# Patient Record
Sex: FEMALE | Race: WHITE | Employment: OTHER | URBAN - METROPOLITAN AREA
[De-identification: names, ages, dates, MRNs, and addresses within clinical notes are randomized per-mention and may not be internally consistent; named-entity substitution may affect disease eponyms.]

---

## 2017-03-07 ENCOUNTER — HOSPITAL ENCOUNTER (OUTPATIENT)
Dept: PHYSICAL THERAPY | Age: 82
Discharge: HOME | End: 2017-03-07
Payer: MEDICARE

## 2017-03-07 PROCEDURE — 97166 OT EVAL MOD COMPLEX 45 MIN: CPT

## 2017-03-07 PROCEDURE — G8988 SELF CARE GOAL STATUS: HCPCS

## 2017-03-07 PROCEDURE — G8987 SELF CARE CURRENT STATUS: HCPCS
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
## 2017-03-07 NOTE — PROGRESS NOTES
Ambulatory/Rehab Services H2 Model Falls Risk Assessment    Risk Factor Pts. ·   Confusion/Disorientation/Impulsivity  []    4 ·   Symptomatic Depression  []   2 ·   Altered Elimination  [x]   1 ·   Dizziness/Vertigo  []   1 ·   Gender (Male)  []   1 ·   Any administered antiepileptics (anticonvulsants):  []   2 ·   Any administered benzodiazepines:  []   1 ·   Visual Impairment (specify):  []   1 ·   Portable Oxygen Use  []   1 ·   Orthostatic ? BP  []   1 ·   History of Recent Falls (within 3 mos.)  [x]   5     Ability to Rise from Chair (choose one) Pts. ·   Ability to rise in a single movement  []   0 ·   Pushes up, successful in one attempt  [x]   1 ·   Multiple attempts, but successful  []   3 ·   Unable to rise without assistance  []   4   Total: (5 or greater = High Risk) 7     Falls Prevention Plan:   []                Physical Limitations to Exercise (specify):   [x]                Mobility Assistance Device (type): Manual wheelchair recommended. []                Exercise/Equipment Adaptation (specify):    ©2010 [facility] [place] [address] [place] [address] Patent #[id number].  Federal Law prohibits the replication, distribution or use without written permission from [facility] [employer]

## 2017-03-23 ENCOUNTER — APPOINTMENT (OUTPATIENT)
Dept: PHYSICAL THERAPY | Age: 82
End: 2017-03-23
Payer: MEDICARE

## 2017-04-27 ENCOUNTER — HOSPITAL ENCOUNTER (OUTPATIENT)
Dept: PHYSICAL THERAPY | Age: 82
Discharge: HOME | End: 2017-04-27
Payer: MEDICARE

## 2017-04-27 PROCEDURE — 97168 OT RE-EVAL EST PLAN CARE: CPT

## 2017-04-27 PROCEDURE — G8986 CARRY D/C STATUS: HCPCS

## 2017-04-27 PROCEDURE — G8985 CARRY GOAL STATUS: HCPCS
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
## 2017-07-06 PROBLEM — K21.9 GASTROESOPHAGEAL REFLUX DISEASE WITHOUT ESOPHAGITIS: Status: ACTIVE | Noted: 2017-07-06

## 2017-07-06 PROBLEM — N18.30 CHRONIC KIDNEY DISEASE, STAGE III (MODERATE) (HCC): Status: ACTIVE | Noted: 2017-03-21

## 2017-07-06 PROBLEM — N32.81 DETRUSOR INSTABILITY OF BLADDER: Status: ACTIVE | Noted: 2017-07-06
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
## 2017-07-20 PROBLEM — B35.1 ONYCHOMYCOSIS: Status: ACTIVE | Noted: 2017-07-20

## 2017-10-12 ENCOUNTER — HOSPITAL ENCOUNTER (OUTPATIENT)
Dept: LAB | Age: 82
Discharge: HOME | End: 2017-10-12

## 2017-10-12 LAB
APPEARANCE UR: CLEAR
BACTERIA URNS QL MICRO: 0 /HPF
BILIRUB UR QL: NEGATIVE
CASTS URNS QL MICRO: 0 /LPF
COLOR UR: YELLOW
EPI CELLS #/AREA URNS HPF: 0 /HPF
GLUCOSE UR STRIP.AUTO-MCNC: NEGATIVE MG/DL
HGB UR QL STRIP: ABNORMAL
KETONES UR QL STRIP.AUTO: NEGATIVE MG/DL
LEUKOCYTE ESTERASE UR QL STRIP.AUTO: NEGATIVE
NITRITE UR QL STRIP.AUTO: NEGATIVE
PH UR STRIP: 6 [PH] (ref 5–9)
PROT UR STRIP-MCNC: NEGATIVE MG/DL
RBC #/AREA URNS HPF: ABNORMAL /HPF
SP GR UR REFRACTOMETRY: 1.01 (ref 1–1.02)
UROBILINOGEN UR QL STRIP.AUTO: 0.2 EU/DL (ref 0.2–1)
WBC URNS QL MICRO: 0 /HPF

## 2017-10-12 PROCEDURE — 81001 URINALYSIS AUTO W/SCOPE: CPT | Performed by: FAMILY MEDICINE

## 2018-01-23 PROBLEM — I47.9 PAROXYSMAL TACHYCARDIA (HCC): Status: ACTIVE | Noted: 2018-01-23

## 2018-03-05 PROBLEM — R42 DIZZINESS: Status: ACTIVE | Noted: 2018-03-05

## 2018-12-19 PROBLEM — R42 DIZZINESS: Status: RESOLVED | Noted: 2018-03-05 | Resolved: 2018-12-19

## 2019-01-31 PROBLEM — G31.84 MCI (MILD COGNITIVE IMPAIRMENT): Status: ACTIVE | Noted: 2019-01-31

## 2019-08-07 PROBLEM — E55.9 VITAMIN D DEFICIENCY: Status: ACTIVE | Noted: 2019-08-07

## 2019-08-07 PROBLEM — N39.46 MIXED STRESS AND URGE URINARY INCONTINENCE: Status: ACTIVE | Noted: 2019-08-07
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
## 2020-02-27 PROBLEM — L89.321 PRESSURE INJURY OF LEFT BUTTOCK, STAGE 1: Status: ACTIVE | Noted: 2020-02-27

## 2020-02-27 PROBLEM — N39.3 SUI (STRESS URINARY INCONTINENCE, FEMALE): Status: ACTIVE | Noted: 2020-02-27

## 2020-08-03 PROBLEM — N39.3 SUI (STRESS URINARY INCONTINENCE, FEMALE): Status: RESOLVED | Noted: 2020-02-27 | Resolved: 2020-08-03

## 2020-08-03 PROBLEM — N32.81 OAB (OVERACTIVE BLADDER): Status: RESOLVED | Noted: 2017-07-06 | Resolved: 2020-08-03

## 2021-04-01 PROBLEM — R07.89 OTHER CHEST PAIN: Status: ACTIVE | Noted: 2021-04-01

## 2022-03-18 PROBLEM — R42 DIZZINESS: Status: ACTIVE | Noted: 2018-03-05

## 2022-03-18 PROBLEM — G31.84 MCI (MILD COGNITIVE IMPAIRMENT): Status: ACTIVE | Noted: 2019-01-31

## 2022-03-18 PROBLEM — N18.30 CHRONIC KIDNEY DISEASE, STAGE III (MODERATE) (HCC): Status: ACTIVE | Noted: 2017-03-21

## 2022-03-19 PROBLEM — N39.46 MIXED STRESS AND URGE URINARY INCONTINENCE: Status: ACTIVE | Noted: 2019-08-07

## 2022-03-19 PROBLEM — R07.89 OTHER CHEST PAIN: Status: ACTIVE | Noted: 2021-04-01

## 2022-03-19 PROBLEM — L89.321 PRESSURE INJURY OF LEFT BUTTOCK, STAGE 1: Status: ACTIVE | Noted: 2020-02-27

## 2022-03-19 PROBLEM — I47.9 PAROXYSMAL TACHYCARDIA (HCC): Status: ACTIVE | Noted: 2018-01-23

## 2022-03-20 PROBLEM — K21.9 GASTROESOPHAGEAL REFLUX DISEASE WITHOUT ESOPHAGITIS: Status: ACTIVE | Noted: 2017-07-06

## 2022-03-20 PROBLEM — E55.9 VITAMIN D DEFICIENCY: Status: ACTIVE | Noted: 2019-08-07

## 2022-06-14 ENCOUNTER — OFFICE VISIT (OUTPATIENT)
Dept: CARDIOLOGY CLINIC | Age: 87
End: 2022-06-14
Payer: MEDICARE

## 2022-06-14 VITALS
BODY MASS INDEX: 22.53 KG/M2 | WEIGHT: 132 LBS | DIASTOLIC BLOOD PRESSURE: 76 MMHG | HEART RATE: 68 BPM | SYSTOLIC BLOOD PRESSURE: 126 MMHG | HEIGHT: 64 IN

## 2022-06-14 DIAGNOSIS — G31.84 MCI (MILD COGNITIVE IMPAIRMENT): ICD-10-CM

## 2022-06-14 DIAGNOSIS — R42 DIZZINESS: ICD-10-CM

## 2022-06-14 DIAGNOSIS — I47.9 PAROXYSMAL TACHYCARDIA (HCC): Primary | ICD-10-CM

## 2022-06-14 DIAGNOSIS — I10 PRIMARY HYPERTENSION: ICD-10-CM

## 2022-06-14 PROCEDURE — 93000 ELECTROCARDIOGRAM COMPLETE: CPT | Performed by: INTERNAL MEDICINE

## 2022-06-14 PROCEDURE — 1123F ACP DISCUSS/DSCN MKR DOCD: CPT | Performed by: INTERNAL MEDICINE

## 2022-06-14 PROCEDURE — 99214 OFFICE O/P EST MOD 30 MIN: CPT | Performed by: INTERNAL MEDICINE

## 2022-06-14 RX ORDER — VITAMIN B COMPLEX
1 CAPSULE ORAL DAILY
COMMUNITY

## 2022-06-14 RX ORDER — CHLORAL HYDRATE 500 MG
3000 CAPSULE ORAL DAILY
COMMUNITY

## 2022-06-14 RX ORDER — SIMETHICONE 80 MG
80 TABLET,CHEWABLE ORAL EVERY 6 HOURS PRN
COMMUNITY

## 2022-06-14 RX ORDER — MULTIVIT-MIN/IRON/FOLIC ACID/K 18-600-40
CAPSULE ORAL
COMMUNITY

## 2022-06-14 ASSESSMENT — ENCOUNTER SYMPTOMS: SHORTNESS OF BREATH: 0

## 2022-06-14 NOTE — PROGRESS NOTES
[address], [address] [facility], [address] [place] [address]  PHONE: [phone]    Michelle Munoz  4/18/1928      SUBJECTIVE:   Michelle Munoz is a 80 y.o. female seen for a follow up visit regarding the following:     Chief Complaint   Patient presents with    Hypertension     yrly        HPI:    75[facility] and [facility]. We have switched her meds around little bit and she takes Benicar for the diagnosis in the diet it seems to be working well. She is not complaining of dizziness. She has significant memory loss. She has no complaints of dizziness or pain or swelling. Past Medical History, Past Surgical History, Family history, Social History, and Medications were all reviewed with the patient today and updated as necessary. Allergies   Allergen Reactions    Streptomycin Shortness Of Breath    Sulfa Antibiotics Anaphylaxis     Other reaction(s):  Anaphylactic shock-A    Sulfamethoxazole Anaphylaxis     Past Medical History:   Diagnosis Date    Constipation     DDD (degenerative disc disease), cervical     Granulosa cell carcinoma (HCC)     Hearing loss     Hepatitis     Hypertension 1990    Hypothyroidism (acquired) 1990    Ovarian cancer ([facility] [address] 2012    s/p    Pelvic fracture (HCC)      Past Surgical History:   Procedure Laterality Date    APPENDECTOMY  1949    HYSTERECTOMY      ORTHOPEDIC SURGERY Right     Trigger finger release    ORTHOPEDIC SURGERY Bilateral     hip prosthesis    ORTHOPEDIC SURGERY      Laminectomy    [place] APPENDECTOMY      TOTAL HIP ARTHROPLASTY  2009    Total    TUMOR REMOVAL      Ovarian Granulosa Cell Tumors x2-     Family History   Problem Relation Age of Onset    Hypertension Mother     Stroke Father     Heart Disease Father     Diabetes Sister       Social History     Tobacco Use    Smoking status: Never Smoker    Smokeless tobacco: Never Used   Substance Use Topics  Alcohol use: No       ROS:    Review of Systems   Constitutional: Negative for decreased appetite. Cardiovascular: Negative for chest pain, irregular heartbeat and palpitations. Respiratory: Negative for shortness of breath. Hematologic/Lymphatic: Negative for bleeding problem. PHYSICAL EXAM:    /76   Pulse 68   Ht 5' 4\" (1.626 m)   Wt 132 lb (59.9 kg)   BMI 22.66 kg/m²        Wt Readings from Last 3 Encounters:   06/14/22 132 lb (59.9 kg)   06/08/21 131 lb (59.4 kg)   04/01/21 135 lb 3.2 oz (61.3 kg)     BP Readings from Last 3 Encounters:   06/14/22 126/76   06/08/21 (!) 146/70   04/01/21 (!) 152/80         Physical Exam  Constitutional:       General: She is not in acute distress. Cardiovascular:      Rate and Rhythm: Normal rate and regular rhythm. Heart sounds: No gallop. Pulmonary:      Effort: Pulmonary effort is normal.   Abdominal:      Tenderness: There is no abdominal tenderness. Neurological:      Mental Status: She is alert. Medical problems and test results were reviewed with the patient today. Normal sinus rhythm. Right bundle branch block. Nonspecific T wave change. ASSESSMENT and PLAN    Sheela Dejesus was seen today for hypertension. Diagnoses and all orders for this visit:    Paroxysmal tachycardia ([address] patient does not have any complaints of tachycardia at this time  -     EKG 12 Lead    Hypertension. Blood pressure looks pretty stable on Benicar and Norvasc. Both of these are generic but she apparently have some difficulty getting them at her [facility]  -     EKG 12 Lead    MCI (mild cognitive impairment) she has had dementia and memory loss which is unchanged    Dizziness currently stable no complaints of dizziness    Overall she seems stable. Would like to see her on an annual basis. [unfilled]      No follow-up provider specified.     Dutch Michelle MD  6/14/2022  11:38 AM